# Patient Record
Sex: MALE | Race: WHITE
[De-identification: names, ages, dates, MRNs, and addresses within clinical notes are randomized per-mention and may not be internally consistent; named-entity substitution may affect disease eponyms.]

---

## 2019-04-12 ENCOUNTER — HOSPITAL ENCOUNTER (EMERGENCY)
Dept: HOSPITAL 80 - FED | Age: 64
Discharge: HOME | End: 2019-04-12
Payer: COMMERCIAL

## 2019-04-12 VITALS — SYSTOLIC BLOOD PRESSURE: 153 MMHG | DIASTOLIC BLOOD PRESSURE: 112 MMHG

## 2019-04-12 DIAGNOSIS — R11.10: ICD-10-CM

## 2019-04-12 DIAGNOSIS — I48.91: Primary | ICD-10-CM

## 2019-04-12 LAB — PLATELET # BLD: 345 10^3/UL (ref 150–400)

## 2019-04-12 NOTE — CPEKG
Test Reason : OPEN

Blood Pressure : ***/*** mmHG

Vent. Rate : 167 BPM     Atrial Rate : 201 BPM

   P-R Int : 051 ms          QRS Dur : 075 ms

    QT Int : 297 ms       P-R-T Axes : 098 009 044 degrees

   QTc Int : 496 ms

 

Atrial fibrillation with rapid V-rate

ST depression, probably rate related

 

Confirmed by Reina Thompson ([id number]) on 4/12/2019 7:14:35 PM

 

Referred By: Reina Thompson           Confirmed By:Reina Thompson

## 2019-04-12 NOTE — EDPHY
H & P


Time Seen by Provider: 04/12/19 14:56


HPI/ROS: 





CHIEF COMPLAINT:  Rapid heart rate 





HISTORY OF PRESENT ILLNESS:  63-year-old male with hypertension presents with 

rapid heart rate.  Onset of rapid irregular heart rate at 11 a.m. Today.  He 

went to [facility] and EKG revealed atrial fibrillation with a rapid 

ventricular rate.  His only complaint today is intermittent nausea.  No 

dizziness, shortness of breath or chest pain.  No prior similar symptoms.  No 

known history of cardiopulmonary disease.





REVIEW OF SYSTEMS:  complete 10 point ROS reviewed and is negative except for 

the noted elements in the HPI








- Family History


Significant Family History: No pertinent family hx





- Social History


Alcohol Use: Sober


Drug Use: None





- Physical Exam


Exam: 





General Appearance:  Alert, pleasant


Eyes:  Pupils equal and round, no conjunctival pallor


ENT, Mouth:  Mucous membranes moist


Neck:  Normal inspection


Respiratory:  Lungs are clear to auscultation


Cardiovascular:  Irregularly irregular tachycardia


Gastrointestinal:  Abdomen is soft and nontender


Neurological:  A&O, nonfocal exam


Skin:  Warm and dry, no rash


Extremities:  Nontender, no pedal edema


Psychiatric:  Mood and affect normal





Constitutional: 


 Initial Vital Signs











Temperature (C)  36.6 C   04/12/19 14:55


 


Heart Rate  138 H  04/12/19 14:55


 


Respiratory Rate  16   04/12/19 14:55


 


Blood Pressure  138/108 H  04/12/19 14:55


 


O2 Sat (%)  98   04/12/19 14:55








 











O2 Delivery Mode               Room Air














Allergies/Adverse Reactions: 


 





ACE Inhibitors Allergy (Verified 04/12/19 15:01)


 








Home Medications: 














 Medication  Instructions  Recorded


 


Herbals/Supplements -Info Only 1 ea PO DAILY 04/12/19


 


Ibuprofen [Motrin (*)] 200 - 600 mg PO DAILY PRN 04/12/19


 


Metoprolol Tartrate [Lopressor 25 12.5 mg PO BID #60 tab 04/12/19





mg (*)]  














Medical Decision Making





- Diagnostics


EKG Interpretation: 





EKG interpreted by me reveals atrial fibrillation with a ventricular rate of 167

, ST segment depression in leads V2 through V6.  Interpretation:  Abnormal EKG





Imaging Results: 


 Imaging Impressions





Chest X-Ray  04/12/19 15:02


Impression: Clear lungs. No acute process.











Imaging: Discussed imaging studies w/ On call Radiologist


ED Course/Re-evaluation: 





This patient presents in atrial fibrillation with RVR.  Diltiazem 10 mg IV, 

followed by a diltiazem drip.  The patient converted to normal sinus rhythm.  

Repeat EKG reveals normal sinus rhythm, rate 82, no ST or T segment changes.  

The patient is asymptomatic.  I consulted [facility] and spoke with Jona.  

Will start patient on metoprolol 12.5 mg twice daily and a full-strength 

aspirin daily.  He will follow up at [facility].


Differential Diagnosis: 





Differential diagnosis includes though it is not limited to pneumonia, 

pneumothorax, pulmonary embolism, aortic dissection, pericarditis, acute 

coronary syndrome.





- Data Points


Laboratory Results: 


 Laboratory Results





 04/12/19 15:06 





 04/12/19 15:06 





 











  04/12/19 04/12/19 04/12/19





  15:06 15:06 15:06


 


WBC      8.33 10^3/uL 10^3/uL





     (3.80-9.50) 


 


RBC      5.81 10^6/uL 10^6/uL





     (4.40-6.38) 


 


Hgb      18.7 g/dL H g/dL





     (13.7-17.5) 


 


Hct      55.8 % H %





     (40.0-51.0) 


 


MCV      96.0 fL fL





     (81.5-99.8) 


 


MCH      32.2 pg pg





     (27.9-34.1) 


 


MCHC      33.5 g/dL g/dL





     (32.4-36.7) 


 


RDW      12.2 % %





     (11.5-15.2) 


 


Plt Count      345 10^3/uL 10^3/uL





     (150-400) 


 


MPV      9.7 fL fL





     (8.7-11.7) 


 


Neut % (Auto)      62.2 % %





     (39.3-74.2) 


 


Lymph % (Auto)      26.2 % %





     (15.0-45.0) 


 


Mono % (Auto)      10.4 % %





     (4.5-13.0) 


 


Eos % (Auto)      0.6 % %





     (0.6-7.6) 


 


Baso % (Auto)      0.4 % %





     (0.3-1.7) 


 


Nucleat RBC Rel Count      0.0 % %





     (0.0-0.2) 


 


Absolute Neuts (auto)      5.18 10^3/uL 10^3/uL





     (1.70-6.50) 


 


Absolute Lymphs (auto)      2.18 10^3/uL 10^3/uL





     (1.00-3.00) 


 


Absolute Monos (auto)      0.87 10^3/uL H 10^3/uL





     (0.30-0.80) 


 


Absolute Eos (auto)      0.05 10^3/uL 10^3/uL





     (0.03-0.40) 


 


Absolute Basos (auto)      0.03 10^3/uL 10^3/uL





     (0.02-0.10) 


 


Absolute Nucleated RBC      0.00 10^3/uL 10^3/uL





     (0-0.01) 


 


Immature Gran %      0.2 % %





     (0.0-1.1) 


 


Immature Gran #      0.02 10^3/uL 10^3/uL





     (0.00-0.10) 


 


D-Dimer    0.30 ug/mLFEU ug/mLFEU  





    (0.00-0.50)  


 


Sodium  141 mEq/L mEq/L    





   (135-145)   


 


Potassium  3.7 mEq/L mEq/L    





   (3.5-5.2)   


 


Chloride  105 mEq/L mEq/L    





   ()   


 


Carbon Dioxide  25 mEq/l mEq/l    





   (22-31)   


 


Anion Gap  11 mEq/L mEq/L    





   (6-14)   


 


BUN  14 mg/dL mg/dL    





   (7-23)   


 


Creatinine  1.3 mg/dL mg/dL    





   (0.7-1.3)   


 


Estimated GFR  56     





    


 


Glucose  104 mg/dL H mg/dL    





   ()   


 


Calcium  11.1 mg/dL H mg/dL    





   (8.5-10.4)   


 


Phosphorus  3.1 mg/dL mg/dL    





   (2.5-4.5)   


 


POC Troponin I      





    


 


NT-Pro-B Natriuret Pep  304 pg/mL H pg/mL    





   (0-125)   














  04/12/19





  15:03


 


WBC  





  


 


RBC  





  


 


Hgb  





  


 


Hct  





  


 


MCV  





  


 


MCH  





  


 


MCHC  





  


 


RDW  





  


 


Plt Count  





  


 


MPV  





  


 


Neut % (Auto)  





  


 


Lymph % (Auto)  





  


 


Mono % (Auto)  





  


 


Eos % (Auto)  





  


 


Baso % (Auto)  





  


 


Nucleat RBC Rel Count  





  


 


Absolute Neuts (auto)  





  


 


Absolute Lymphs (auto)  





  


 


Absolute Monos (auto)  





  


 


Absolute Eos (auto)  





  


 


Absolute Basos (auto)  





  


 


Absolute Nucleated RBC  





  


 


Immature Gran %  





  


 


Immature Gran #  





  


 


D-Dimer  





  


 


Sodium  





  


 


Potassium  





  


 


Chloride  





  


 


Carbon Dioxide  





  


 


Anion Gap  





  


 


BUN  





  


 


Creatinine  





  


 


Estimated GFR  





  


 


Glucose  





  


 


Calcium  





  


 


Phosphorus  





  


 


POC Troponin I  0.00 ng/mL ng/mL





   (0.00-0.08) 


 


NT-Pro-B Natriuret Pep  





  











Medications Given: 


 








Discontinued Medications





Diltiazem HCl (Cardizem 25 Mg/5 Ml Vial)  10 mg IVP EDNOW ONE


   Stop: 04/12/19 15:11


   Last Admin: 04/12/19 15:29 Dose:  10 mg


Diltiazem/Dextrose (Diltiazem 125mg/125ml (Premix))  125 mls @ 0 mls/hr IV 

EDNOW ONE; As Directed


   PRN Reason: Protocol


   Stop: 04/12/19 15:11


   Last Admin: 04/12/19 15:52 Dose:  125 mls





Point of Care Test Results: 


 Chemistry











  04/12/19





  15:03


 


POC Troponin I  0.00 ng/mL ng/mL





   (0.00-0.08) 














Departure





- Departure


Disposition: Home, Routine, Self-Care


Clinical Impression: 


 Atrial fibrillation with RVR





Condition: Good

## 2019-04-12 NOTE — CPEKG
Test Reason : OPEN

Blood Pressure : ***/*** mmHG

Vent. Rate : 082 BPM     Atrial Rate : 082 BPM

   P-R Int : 154 ms          QRS Dur : 079 ms

    QT Int : 374 ms       P-R-T Axes : 074 043 062 degrees

   QTc Int : 437 ms

 

Sinus rhythm

 

Confirmed by Reina Thompson ([id number]) on 4/12/2019 7:14:12 PM

 

Referred By: Kelly Cushing           Confirmed By:Reina Thompson